# Patient Record
Sex: FEMALE | Race: WHITE | NOT HISPANIC OR LATINO | Employment: FULL TIME | URBAN - METROPOLITAN AREA
[De-identification: names, ages, dates, MRNs, and addresses within clinical notes are randomized per-mention and may not be internally consistent; named-entity substitution may affect disease eponyms.]

---

## 2021-05-19 ENCOUNTER — HOSPITAL ENCOUNTER (OUTPATIENT)
Dept: RADIOLOGY | Facility: MEDICAL CENTER | Age: 56
End: 2021-05-19
Payer: COMMERCIAL

## 2021-05-28 ENCOUNTER — HOSPITAL ENCOUNTER (OUTPATIENT)
Dept: RADIOLOGY | Facility: MEDICAL CENTER | Age: 56
End: 2021-05-28
Attending: FAMILY MEDICINE
Payer: COMMERCIAL

## 2021-05-28 DIAGNOSIS — N95.8 OTHER SPECIFIED MENOPAUSAL AND PERIMENOPAUSAL DISORDERS: ICD-10-CM

## 2021-05-28 DIAGNOSIS — Z12.31 ENCOUNTER FOR MAMMOGRAM TO ESTABLISH BASELINE MAMMOGRAM: ICD-10-CM

## 2021-05-28 PROCEDURE — 77063 BREAST TOMOSYNTHESIS BI: CPT

## 2021-05-28 PROCEDURE — 77080 DXA BONE DENSITY AXIAL: CPT

## 2021-06-15 ENCOUNTER — HOSPITAL ENCOUNTER (OUTPATIENT)
Facility: MEDICAL CENTER | Age: 56
End: 2021-06-15
Attending: FAMILY MEDICINE
Payer: COMMERCIAL

## 2021-06-15 PROCEDURE — 84443 ASSAY THYROID STIM HORMONE: CPT

## 2021-06-16 LAB — TSH SERPL DL<=0.005 MIU/L-ACNC: 0.45 UIU/ML (ref 0.38–5.33)

## 2023-04-14 ENCOUNTER — HOSPITAL ENCOUNTER (OUTPATIENT)
Facility: MEDICAL CENTER | Age: 58
End: 2023-04-14
Attending: FAMILY MEDICINE
Payer: COMMERCIAL

## 2023-04-14 LAB
T3FREE SERPL-MCNC: 2.45 PG/ML (ref 2–4.4)
THYROPEROXIDASE AB SERPL-ACNC: 15 IU/ML (ref 0–9)
TSH SERPL DL<=0.005 MIU/L-ACNC: 1.14 UIU/ML (ref 0.38–5.33)

## 2023-04-14 PROCEDURE — 84481 FREE ASSAY (FT-3): CPT

## 2023-04-14 PROCEDURE — 84443 ASSAY THYROID STIM HORMONE: CPT

## 2023-04-14 PROCEDURE — 86376 MICROSOMAL ANTIBODY EACH: CPT

## 2023-07-05 ENCOUNTER — OFFICE VISIT (OUTPATIENT)
Dept: URGENT CARE | Facility: PHYSICIAN GROUP | Age: 58
End: 2023-07-05
Payer: COMMERCIAL

## 2023-07-05 VITALS
DIASTOLIC BLOOD PRESSURE: 82 MMHG | RESPIRATION RATE: 20 BRPM | TEMPERATURE: 96.9 F | HEIGHT: 65 IN | OXYGEN SATURATION: 98 % | BODY MASS INDEX: 39.15 KG/M2 | WEIGHT: 235 LBS | SYSTOLIC BLOOD PRESSURE: 126 MMHG | HEART RATE: 94 BPM

## 2023-07-05 DIAGNOSIS — R05.1 ACUTE COUGH: ICD-10-CM

## 2023-07-05 DIAGNOSIS — J02.9 SORE THROAT: ICD-10-CM

## 2023-07-05 DIAGNOSIS — E78.2 MIXED HYPERLIPIDEMIA: ICD-10-CM

## 2023-07-05 DIAGNOSIS — U07.1 COVID-19: ICD-10-CM

## 2023-07-05 PROBLEM — E55.9 VITAMIN D DEFICIENCY: Status: ACTIVE | Noted: 2018-02-15

## 2023-07-05 PROBLEM — N95.9 MENOPAUSAL AND POSTMENOPAUSAL DISORDER: Status: ACTIVE | Noted: 2019-02-21

## 2023-07-05 PROBLEM — R73.02 IMPAIRED GLUCOSE TOLERANCE: Status: ACTIVE | Noted: 2018-02-12

## 2023-07-05 PROBLEM — E03.9 ACQUIRED HYPOTHYROIDISM: Status: ACTIVE | Noted: 2018-02-12

## 2023-07-05 LAB
FLUAV RNA SPEC QL NAA+PROBE: NEGATIVE
FLUBV RNA SPEC QL NAA+PROBE: NEGATIVE
RSV RNA SPEC QL NAA+PROBE: NEGATIVE
S PYO DNA SPEC NAA+PROBE: NOT DETECTED
SARS-COV-2 RNA RESP QL NAA+PROBE: POSITIVE

## 2023-07-05 PROCEDURE — 3079F DIAST BP 80-89 MM HG: CPT | Performed by: PHYSICIAN ASSISTANT

## 2023-07-05 PROCEDURE — 3074F SYST BP LT 130 MM HG: CPT | Performed by: PHYSICIAN ASSISTANT

## 2023-07-05 PROCEDURE — 87651 STREP A DNA AMP PROBE: CPT | Performed by: PHYSICIAN ASSISTANT

## 2023-07-05 PROCEDURE — 99204 OFFICE O/P NEW MOD 45 MIN: CPT | Performed by: PHYSICIAN ASSISTANT

## 2023-07-05 PROCEDURE — 0241U POCT CEPHEID COV-2, FLU A/B, RSV - PCR: CPT | Performed by: PHYSICIAN ASSISTANT

## 2023-07-05 RX ORDER — ROSUVASTATIN CALCIUM 40 MG/1
1 TABLET, COATED ORAL
COMMUNITY
Start: 2023-04-14

## 2023-07-05 RX ORDER — DEXTROMETHORPHAN HYDROBROMIDE AND PROMETHAZINE HYDROCHLORIDE 15; 6.25 MG/5ML; MG/5ML
5 SYRUP ORAL EVERY 4 HOURS PRN
Qty: 120 ML | Refills: 0 | Status: SHIPPED | OUTPATIENT
Start: 2023-07-05

## 2023-07-05 RX ORDER — LEVOTHYROXINE SODIUM 0.15 MG/1
1 TABLET ORAL
COMMUNITY
Start: 2023-04-14

## 2023-07-05 ASSESSMENT — ENCOUNTER SYMPTOMS
TROUBLE SWALLOWING: 0
COUGH: 1
HEADACHES: 1

## 2023-07-05 NOTE — PROGRESS NOTES
"Subjective:   Patricia Bliss is a 57 y.o. female who presents for Pharyngitis (Headache,x1 day)        Pharyngitis   This is a new problem. Episode onset: 3 days. The problem has been gradually worsening. There has been no fever. The pain is moderate. Associated symptoms include congestion, coughing (worse at night) and headaches. Pertinent negatives include no trouble swallowing. Associated symptoms comments: Pain with swallowing, throat feels \"raw\". She has had no exposure to strep or mono. Treatments tried: hot tea with honey, nyquil. The treatment provided mild relief.     Review of Systems   HENT:  Positive for congestion. Negative for trouble swallowing.         Ear fullness   Respiratory:  Positive for cough (worse at night).    Neurological:  Positive for headaches.       PMH:  has no past medical history on file.  MEDS:   Current Outpatient Medications:     levothyroxine (SYNTHROID) 150 MCG Tab, Take 1 Tablet by mouth every day., Disp: , Rfl:     rosuvastatin (CRESTOR) 40 MG tablet, Take 1 Tablet by mouth every day., Disp: , Rfl:     promethazine-dextromethorphan (PROMETHAZINE-DM) 6.25-15 MG/5ML syrup, Take 5 mL by mouth every four hours as needed for Cough., Disp: 120 mL, Rfl: 0    Nirmatrelvir&Ritonavir 300/100 20 x 150 MG & 10 x 100MG Tablet Therapy Pack, Take 300 mg nirmatrelvir (two 150 mg tablets) with 100 mg ritonavir (one 100 mg tablet) by mouth, with all three tablets taken together twice daily for 5 days., Disp: 30 Each, Rfl: 0  ALLERGIES: No Known Allergies  SURGHX: History reviewed. No pertinent surgical history.  SOCHX:    FH: Family history was reviewed, no pertinent findings to report   Objective:   /82 (BP Location: Right arm, Patient Position: Sitting, BP Cuff Size: Adult)   Pulse 94   Temp 36.1 °C (96.9 °F) (Temporal)   Resp 20   Ht 1.651 m (5' 5\")   Wt 107 kg (235 lb)   SpO2 98%   BMI 39.11 kg/m²   Physical Exam  Vitals reviewed.   Constitutional:       General: She is not in " acute distress.     Appearance: Normal appearance. She is well-developed. She is not toxic-appearing.   HENT:      Head: Normocephalic and atraumatic.      Right Ear: Tympanic membrane, ear canal and external ear normal.      Left Ear: Tympanic membrane, ear canal and external ear normal.      Nose: Congestion present.      Mouth/Throat:      Lips: Pink.      Mouth: Mucous membranes are moist.      Pharynx: Oropharynx is clear. Uvula midline. Posterior oropharyngeal erythema present. No oropharyngeal exudate or uvula swelling.   Cardiovascular:      Rate and Rhythm: Normal rate and regular rhythm.      Heart sounds: Normal heart sounds, S1 normal and S2 normal.   Pulmonary:      Effort: Pulmonary effort is normal. No respiratory distress.      Breath sounds: Normal breath sounds. No stridor. No decreased breath sounds, wheezing, rhonchi or rales.   Skin:     General: Skin is dry.   Neurological:      Comments: Alert and oriented.    Psychiatric:         Speech: Speech normal.         Behavior: Behavior normal.           Assessment/Plan:   1. COVID-19  - Nirmatrelvir&Ritonavir 300/100 20 x 150 MG & 10 x 100MG Tablet Therapy Pack; Take 300 mg nirmatrelvir (two 150 mg tablets) with 100 mg ritonavir (one 100 mg tablet) by mouth, with all three tablets taken together twice daily for 5 days.  Dispense: 30 Each; Refill: 0    2. Acute cough  - promethazine-dextromethorphan (PROMETHAZINE-DM) 6.25-15 MG/5ML syrup; Take 5 mL by mouth every four hours as needed for Cough.  Dispense: 120 mL; Refill: 0  - POCT CoV-2, Flu A/B, RSV by PCR    3. Sore throat  - POCT GROUP A STREP, PCR    4. Mixed hyperlipidemia  - Nirmatrelvir&Ritonavir 300/100 20 x 150 MG & 10 x 100MG Tablet Therapy Pack; Take 300 mg nirmatrelvir (two 150 mg tablets) with 100 mg ritonavir (one 100 mg tablet) by mouth, with all three tablets taken together twice daily for 5 days.  Dispense: 30 Each; Refill: 0    Other orders  - levothyroxine (SYNTHROID) 150 MCG Tab;  Take 1 Tablet by mouth every day.  - rosuvastatin (CRESTOR) 40 MG tablet; Take 1 Tablet by mouth every day.    Positive for Covid 19. Pt has good understanding of etiology and disease course. Quarantine [place] CDC guidelines- guidelines reviewed.    Pt qualifies for treatment with Paxlovid. Paxlovid risks, benefits, side effects reviewed. Medications reviewed for potential interactions.  Patient will hold Crestor while taking.  She has not taken this medication today.  Renal dose adjustment not indicated.    Patient may also continue symptomatic care with 12-hour Mucinex and antitussives as needed.  Recommend reevaluation with any new or worsening symptoms.  If patient develops severe symptoms such as shortness of breath, chest pain, difficulty breathing, pain with breathing or other severe and concerning symptoms-to ED for reevaluation.

## 2023-07-05 NOTE — LETTER
[facility] [place]  [address]  [place] [address]     July 5, 2023    Patient: Patricia Bliss   YOB: 1965   Date of Visit: 7/5/2023       To Whom It May Concern:    Patricia Bliss was seen and treated in our department on 7/5/2023.  Please excuse her from work on 7/9 through 7/11/2023.    Sincerely,     Hans Schultz P.A.-C.

## 2023-07-05 NOTE — LETTER
[facility] [place]  [address]  [place] [address]     July 5, 2023    Patient: Patricia Bliss   YOB: 1965   Date of Visit: 7/5/2023       To Whom It May Concern:    Patricia Bliss was seen and treated in our department on 7/5/2023.  Please excuse her from work on 7/5 through 7/6/2023.    Sincerely,     Hans Schultz P.A.-C.

## 2024-07-19 ENCOUNTER — HOSPITAL ENCOUNTER (OUTPATIENT)
Facility: MEDICAL CENTER | Age: 59
End: 2024-07-19
Attending: FAMILY MEDICINE
Payer: COMMERCIAL

## 2024-07-19 LAB
ALBUMIN SERPL BCP-MCNC: 4.1 G/DL (ref 3.2–4.9)
ALBUMIN/GLOB SERPL: 1.9 G/DL
ALP SERPL-CCNC: 77 U/L (ref 30–99)
ALT SERPL-CCNC: 27 U/L (ref 2–50)
ANION GAP SERPL CALC-SCNC: 12 MMOL/L (ref 7–16)
AST SERPL-CCNC: 25 U/L (ref 12–45)
BILIRUB SERPL-MCNC: 0.4 MG/DL (ref 0.1–1.5)
BUN SERPL-MCNC: 13 MG/DL (ref 8–22)
CALCIUM ALBUM COR SERPL-MCNC: 9.6 MG/DL (ref 8.5–10.5)
CALCIUM SERPL-MCNC: 9.7 MG/DL (ref 8.5–10.5)
CHLORIDE SERPL-SCNC: 107 MMOL/L (ref 96–112)
CO2 SERPL-SCNC: 24 MMOL/L (ref 20–33)
CREAT SERPL-MCNC: 0.67 MG/DL (ref 0.5–1.4)
EST. AVERAGE GLUCOSE BLD GHB EST-MCNC: 134 MG/DL
GFR SERPLBLD CREATININE-BSD FMLA CKD-EPI: 99 ML/MIN/1.73 M 2
GLOBULIN SER CALC-MCNC: 2.2 G/DL (ref 1.9–3.5)
GLUCOSE SERPL-MCNC: 131 MG/DL (ref 65–99)
HBA1C MFR BLD: 6.3 % (ref 4–5.6)
POTASSIUM SERPL-SCNC: 4.5 MMOL/L (ref 3.6–5.5)
PROT SERPL-MCNC: 6.3 G/DL (ref 6–8.2)
SODIUM SERPL-SCNC: 143 MMOL/L (ref 135–145)

## 2024-07-19 PROCEDURE — 84443 ASSAY THYROID STIM HORMONE: CPT

## 2024-07-19 PROCEDURE — 83036 HEMOGLOBIN GLYCOSYLATED A1C: CPT

## 2024-07-19 PROCEDURE — 84439 ASSAY OF FREE THYROXINE: CPT

## 2024-07-19 PROCEDURE — 80053 COMPREHEN METABOLIC PANEL: CPT

## 2024-07-22 LAB
T4 FREE SERPL-MCNC: 1.44 NG/DL (ref 0.93–1.7)
TSH SERPL-ACNC: 0.21 UIU/ML (ref 0.35–5.5)

## 2024-10-25 ENCOUNTER — HOSPITAL ENCOUNTER (OUTPATIENT)
Facility: MEDICAL CENTER | Age: 59
End: 2024-10-25
Attending: FAMILY MEDICINE
Payer: COMMERCIAL

## 2024-10-25 LAB
ALBUMIN SERPL BCP-MCNC: 4.6 G/DL (ref 3.2–4.9)
ALBUMIN/GLOB SERPL: 1.8 G/DL
ALP SERPL-CCNC: 81 U/L (ref 30–99)
ALT SERPL-CCNC: 26 U/L (ref 2–50)
ANION GAP SERPL CALC-SCNC: 16 MMOL/L (ref 7–16)
AST SERPL-CCNC: 25 U/L (ref 12–45)
BILIRUB SERPL-MCNC: 0.5 MG/DL (ref 0.1–1.5)
BUN SERPL-MCNC: 12 MG/DL (ref 8–22)
CALCIUM ALBUM COR SERPL-MCNC: 9.3 MG/DL (ref 8.5–10.5)
CALCIUM SERPL-MCNC: 9.8 MG/DL (ref 8.5–10.5)
CHLORIDE SERPL-SCNC: 105 MMOL/L (ref 96–112)
CO2 SERPL-SCNC: 23 MMOL/L (ref 20–33)
CREAT SERPL-MCNC: 0.62 MG/DL (ref 0.5–1.4)
EST. AVERAGE GLUCOSE BLD GHB EST-MCNC: 143 MG/DL
GFR SERPLBLD CREATININE-BSD FMLA CKD-EPI: 101 ML/MIN/1.73 M 2
GLOBULIN SER CALC-MCNC: 2.6 G/DL (ref 1.9–3.5)
GLUCOSE SERPL-MCNC: 105 MG/DL (ref 65–99)
HBA1C MFR BLD: 6.6 % (ref 4–5.6)
POTASSIUM SERPL-SCNC: 4.8 MMOL/L (ref 3.6–5.5)
PROT SERPL-MCNC: 7.2 G/DL (ref 6–8.2)
SODIUM SERPL-SCNC: 144 MMOL/L (ref 135–145)
T4 FREE SERPL-MCNC: 1.6 NG/DL (ref 0.93–1.7)
TSH SERPL-ACNC: 0.09 UIU/ML (ref 0.35–5.5)

## 2024-10-25 PROCEDURE — 80053 COMPREHEN METABOLIC PANEL: CPT

## 2024-10-25 PROCEDURE — 84443 ASSAY THYROID STIM HORMONE: CPT

## 2024-10-25 PROCEDURE — 83036 HEMOGLOBIN GLYCOSYLATED A1C: CPT

## 2024-10-25 PROCEDURE — 84439 ASSAY OF FREE THYROXINE: CPT

## 2025-08-01 ENCOUNTER — HOSPITAL ENCOUNTER (OUTPATIENT)
Facility: MEDICAL CENTER | Age: 60
End: 2025-08-01
Attending: FAMILY MEDICINE
Payer: COMMERCIAL

## 2025-08-01 LAB
T4 FREE SERPL-MCNC: 0.98 NG/DL (ref 0.93–1.7)
TSH SERPL-ACNC: 3.5 UIU/ML (ref 0.38–5.33)

## 2025-08-01 PROCEDURE — 84443 ASSAY THYROID STIM HORMONE: CPT

## 2025-08-01 PROCEDURE — 84439 ASSAY OF FREE THYROXINE: CPT
